# Patient Record
Sex: FEMALE | Race: WHITE | Employment: FULL TIME | URBAN - METROPOLITAN AREA
[De-identification: names, ages, dates, MRNs, and addresses within clinical notes are randomized per-mention and may not be internally consistent; named-entity substitution may affect disease eponyms.]

---

## 2017-02-25 ENCOUNTER — OFFICE VISIT (OUTPATIENT)
Dept: OCCUPATIONAL MEDICINE | Age: 22
End: 2017-02-25
Attending: PREVENTIVE MEDICINE

## 2017-02-25 DIAGNOSIS — Z01.84 IMMUNITY STATUS TESTING: Primary | ICD-10-CM

## 2017-02-25 LAB
HBV SURFACE AB SER QL: NONREACTIVE
HBV SURFACE AB SERPL IA-ACNC: 3.34 MIU/ML
RUBV IGG SER QL: POSITIVE

## 2017-02-25 PROCEDURE — 86765 RUBEOLA ANTIBODY: CPT

## 2017-02-25 PROCEDURE — 86706 HEP B SURFACE ANTIBODY: CPT

## 2017-02-25 PROCEDURE — 86735 MUMPS ANTIBODY: CPT

## 2017-02-25 PROCEDURE — 86762 RUBELLA ANTIBODY: CPT

## 2017-02-25 PROCEDURE — 86787 VARICELLA-ZOSTER ANTIBODY: CPT

## 2017-02-28 LAB
MEV IGG SER IA-ACNC: POSITIVE
MUV IGG SER IA-ACNC: POSITIVE
VZV IGG SER IA-ACNC: POSITIVE

## 2017-03-04 ENCOUNTER — OFFICE VISIT (OUTPATIENT)
Dept: OCCUPATIONAL MEDICINE | Age: 22
End: 2017-03-04
Attending: PHYSICIAN ASSISTANT

## 2017-04-01 ENCOUNTER — OFFICE VISIT (OUTPATIENT)
Dept: OCCUPATIONAL MEDICINE | Age: 22
End: 2017-04-01
Attending: PREVENTIVE MEDICINE

## 2017-09-16 ENCOUNTER — OFFICE VISIT (OUTPATIENT)
Dept: OCCUPATIONAL MEDICINE | Age: 22
End: 2017-09-16
Attending: PREVENTIVE MEDICINE

## 2021-04-21 ENCOUNTER — OFFICE VISIT (OUTPATIENT)
Dept: INTEGRATIVE MEDICINE | Facility: CLINIC | Age: 26
End: 2021-04-21
Payer: COMMERCIAL

## 2021-04-21 VITALS
HEIGHT: 63.5 IN | BODY MASS INDEX: 25.09 KG/M2 | HEART RATE: 116 BPM | WEIGHT: 143.38 LBS | SYSTOLIC BLOOD PRESSURE: 116 MMHG | DIASTOLIC BLOOD PRESSURE: 66 MMHG | OXYGEN SATURATION: 98 %

## 2021-04-21 DIAGNOSIS — K59.00 CONSTIPATION, UNSPECIFIED CONSTIPATION TYPE: ICD-10-CM

## 2021-04-21 DIAGNOSIS — E55.9 VITAMIN D DEFICIENCY: ICD-10-CM

## 2021-04-21 DIAGNOSIS — R53.82 CHRONIC FATIGUE: Primary | ICD-10-CM

## 2021-04-21 DIAGNOSIS — L70.9 ACNE, UNSPECIFIED ACNE TYPE: ICD-10-CM

## 2021-04-21 DIAGNOSIS — G47.10 HYPERSOMNIA: ICD-10-CM

## 2021-04-21 DIAGNOSIS — R45.86 MOOD SWINGS: ICD-10-CM

## 2021-04-21 PROCEDURE — 3078F DIAST BP <80 MM HG: CPT | Performed by: FAMILY MEDICINE

## 2021-04-21 PROCEDURE — 3008F BODY MASS INDEX DOCD: CPT | Performed by: FAMILY MEDICINE

## 2021-04-21 PROCEDURE — 3074F SYST BP LT 130 MM HG: CPT | Performed by: FAMILY MEDICINE

## 2021-04-21 PROCEDURE — 99204 OFFICE O/P NEW MOD 45 MIN: CPT | Performed by: FAMILY MEDICINE

## 2021-04-21 RX ORDER — SPIRONOLACTONE 25 MG/1
TABLET ORAL
COMMUNITY
Start: 2020-12-21

## 2021-04-21 NOTE — PATIENT INSTRUCTIONS
I have complete kateryna in the body's ability to heal and transform. The products and items listed below (the “Products”)  and their claims have not been evaluated by the Food and Drug Administration.  Dietary products are not intended to treat, prevent, m health and energy levels. This test is not covered by insurance and costs $100    See [email or web address] for more information.     PLEASE NOTE: As this is a lab test done by an outside company, these results do not pull into your [facility] lab r

## 2021-04-21 NOTE — PROGRESS NOTES
Nguyen Chan is a 26year old female. Patient presents with:  New Patient  Integrative Medicine Consult: fatigue, emotional erratic, hormones      HPI:     Several years ago was feeling \"hormonal\"  Huge swings in her emotions.    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     Social Determinants of Health  Financial Resource Strain:       Difficulty of Paying adenopathy. Skin:     General: Skin is warm and dry. Neurological:      Mental Status: She is alert and oriented to person, place, and time. Cranial Nerves: No cranial nerve deficit. Gait: Gait is intact.    Psychiatric:         Mood and Affec FERRITIN; Future  - CBC WITH DIFFERENTIAL WITH PLATELET; Future  - VITAMIN D, 25-HYDROXY; Future  - VITAMIN B12; Future  - VITAMIN B6; Future  - MAGNESIUM; Future  - EBV, CHRONIC/ACTIVE INFECTION; Future    2.  Constipation, unspecified constipation type  - below (the “Products”)  and their claims have not been evaluated by the Food and Drug Administration. Dietary products are not intended to treat, prevent, mitigate or cure disease.  Ultimately, you must draw your own conclusion as to the efficacy of the Pro [email or web address] for more information. PLEASE NOTE: As this is a lab test done by an outside company, these results do not pull into your [facility] lab results online.  The results will be given to you online and a copy of your results may be s

## 2021-05-08 ENCOUNTER — LAB ENCOUNTER (OUTPATIENT)
Dept: LAB | Age: 26
End: 2021-05-08
Attending: FAMILY MEDICINE
Payer: COMMERCIAL

## 2021-05-08 DIAGNOSIS — L70.9 ACNE, UNSPECIFIED ACNE TYPE: ICD-10-CM

## 2021-05-08 DIAGNOSIS — K59.00 CONSTIPATION, UNSPECIFIED CONSTIPATION TYPE: ICD-10-CM

## 2021-05-08 DIAGNOSIS — R53.82 CHRONIC FATIGUE: ICD-10-CM

## 2021-05-08 DIAGNOSIS — R45.86 MOOD SWINGS: ICD-10-CM

## 2021-05-08 DIAGNOSIS — E55.9 VITAMIN D DEFICIENCY: ICD-10-CM

## 2021-05-08 PROCEDURE — 84439 ASSAY OF FREE THYROXINE: CPT

## 2021-05-08 PROCEDURE — 84481 FREE ASSAY (FT-3): CPT

## 2021-05-08 PROCEDURE — 84402 ASSAY OF FREE TESTOSTERONE: CPT

## 2021-05-08 PROCEDURE — 84443 ASSAY THYROID STIM HORMONE: CPT

## 2021-05-08 PROCEDURE — 84144 ASSAY OF PROGESTERONE: CPT

## 2021-05-08 PROCEDURE — 84403 ASSAY OF TOTAL TESTOSTERONE: CPT

## 2021-05-08 PROCEDURE — 86664 EPSTEIN-BARR NUCLEAR ANTIGEN: CPT

## 2021-05-08 PROCEDURE — 83735 ASSAY OF MAGNESIUM: CPT

## 2021-05-08 PROCEDURE — 82607 VITAMIN B-12: CPT

## 2021-05-08 PROCEDURE — 82306 VITAMIN D 25 HYDROXY: CPT

## 2021-05-08 PROCEDURE — 84207 ASSAY OF VITAMIN B-6: CPT

## 2021-05-08 PROCEDURE — 36415 COLL VENOUS BLD VENIPUNCTURE: CPT

## 2021-05-08 PROCEDURE — 82627 DEHYDROEPIANDROSTERONE: CPT

## 2021-05-08 PROCEDURE — 86800 THYROGLOBULIN ANTIBODY: CPT

## 2021-05-08 PROCEDURE — 86665 EPSTEIN-BARR CAPSID VCA: CPT

## 2021-05-08 PROCEDURE — 86628 CANDIDA ANTIBODY: CPT

## 2021-05-08 PROCEDURE — 82728 ASSAY OF FERRITIN: CPT

## 2021-05-08 PROCEDURE — 84140 ASSAY OF PREGNENOLONE: CPT

## 2021-05-08 PROCEDURE — 82671 ASSAY OF ESTROGENS: CPT

## 2021-05-08 PROCEDURE — 85025 COMPLETE CBC W/AUTO DIFF WBC: CPT

## 2021-05-08 PROCEDURE — 86376 MICROSOMAL ANTIBODY EACH: CPT

## 2021-05-13 ENCOUNTER — MED REC SCAN ONLY (OUTPATIENT)
Dept: INTEGRATIVE MEDICINE | Facility: CLINIC | Age: 26
End: 2021-05-13